# Patient Record
Sex: MALE | Race: ASIAN | Employment: OTHER | URBAN - METROPOLITAN AREA
[De-identification: names, ages, dates, MRNs, and addresses within clinical notes are randomized per-mention and may not be internally consistent; named-entity substitution may affect disease eponyms.]

---

## 2020-11-04 ENCOUNTER — LAB ENCOUNTER (OUTPATIENT)
Dept: LAB | Facility: HOSPITAL | Age: 55
End: 2020-11-04
Attending: INTERNAL MEDICINE
Payer: OTHER GOVERNMENT

## 2020-11-04 DIAGNOSIS — Z20.822 EXPOSURE TO COVID-19 VIRUS: Primary | ICD-10-CM

## 2021-04-05 ENCOUNTER — HOSPITAL ENCOUNTER (EMERGENCY)
Facility: HOSPITAL | Age: 56
Discharge: HOME | End: 2021-04-05
Attending: EMERGENCY MEDICINE
Payer: COMMERCIAL

## 2021-04-05 VITALS
HEART RATE: 107 BPM | OXYGEN SATURATION: 98 % | BODY MASS INDEX: 27.47 KG/M2 | SYSTOLIC BLOOD PRESSURE: 146 MMHG | HEIGHT: 67 IN | TEMPERATURE: 98 F | DIASTOLIC BLOOD PRESSURE: 85 MMHG | RESPIRATION RATE: 20 BRPM | WEIGHT: 175 LBS

## 2021-04-05 DIAGNOSIS — K52.9 GASTROENTERITIS: Primary | ICD-10-CM

## 2021-04-05 PROCEDURE — 85025 COMPLETE CBC W/AUTO DIFF WBC: CPT | Performed by: EMERGENCY MEDICINE

## 2021-04-05 PROCEDURE — 81003 URINALYSIS AUTO W/O SCOPE: CPT | Performed by: EMERGENCY MEDICINE

## 2021-04-05 PROCEDURE — 80076 HEPATIC FUNCTION PANEL: CPT | Performed by: EMERGENCY MEDICINE

## 2021-04-05 PROCEDURE — 80048 BASIC METABOLIC PNL TOTAL CA: CPT | Performed by: EMERGENCY MEDICINE

## 2021-04-05 PROCEDURE — 99284 EMERGENCY DEPT VISIT MOD MDM: CPT

## 2021-04-05 PROCEDURE — 96374 THER/PROPH/DIAG INJ IV PUSH: CPT

## 2021-04-05 PROCEDURE — 96361 HYDRATE IV INFUSION ADD-ON: CPT

## 2021-04-05 RX ORDER — ONDANSETRON 2 MG/ML
4 INJECTION INTRAMUSCULAR; INTRAVENOUS ONCE
Status: COMPLETED | OUTPATIENT
Start: 2021-04-05 | End: 2021-04-05

## 2021-04-05 RX ORDER — ONDANSETRON 4 MG/1
4 TABLET, ORALLY DISINTEGRATING ORAL EVERY 4 HOURS PRN
Qty: 10 TABLET | Refills: 0 | Status: SHIPPED | OUTPATIENT
Start: 2021-04-05 | End: 2021-04-12

## 2021-04-06 NOTE — ED INITIAL ASSESSMENT (HPI)
Was supposed to board a plane this afternoon but didn't due to nausea, vomiting and diarrhea. 3 episode of watery stools today.

## 2021-04-06 NOTE — ED QUICK NOTES
Pt provided and explained d/c instructions, at-home care, follow-up, and rx. Pt in nad at this time. Iv access d/c. Vss. Storm. Ambulatory. A&ox3. Belongings with pt. All questions and concerns addressed.

## 2021-04-06 NOTE — ED PROVIDER NOTES
Patient Seen in: [facility] AND [facility] Emergency Department    History   Patient presents with:  Nausea/Vomiting/Diarrhea    Stated Complaint: n/v after plane ride     HPI    Patient complains of diarrhea, this began today, non bloody, no abd pain.   no vomit Device None (Room air)       Current:/90   Pulse 102   Temp 98.1 °F (36.7 °C) (Oral)   Resp 17   Ht 170.2 cm (5' 7\")   Wt 79.4 kg   SpO2 97%   BMI 27.41 kg/m²   PULSE OX The pulse oximeter revealed  99%  on room air which is not hypoxic and normal f Re-Exam: feeling better will dc      MDM                               Disposition and Plan     Clinical Impression:  Gastroenteritis  (primary encounter diagnosis)    Disposition:  Discharge  4/5/2021 10:25 pm    Follow-up:  Kevin Perez MD  480

## 2021-04-11 DIAGNOSIS — Z23 NEED FOR VACCINATION: ICD-10-CM

## (undated) NOTE — LETTER
Date & Time: 4/5/2021, 10:23 PM  Patient: Danae Fortune  Encounter Provider(s):    Tasha Kwan MD       To Whom It May Concern:    Danae Fortune was seen and treated in our department on 4/5/2021.  He evaluated and treated for an acute medical condition